# Patient Record
Sex: MALE | Race: WHITE
[De-identification: names, ages, dates, MRNs, and addresses within clinical notes are randomized per-mention and may not be internally consistent; named-entity substitution may affect disease eponyms.]

---

## 2018-10-22 ENCOUNTER — HOSPITAL ENCOUNTER (OUTPATIENT)
Dept: HOSPITAL 61 - KCIC MRI | Age: 38
Discharge: HOME | End: 2018-10-22
Attending: ORTHOPAEDIC SURGERY
Payer: COMMERCIAL
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
## 2018-10-22 DIAGNOSIS — S42.031A: Primary | ICD-10-CM

## 2018-10-22 PROCEDURE — 73221 MRI JOINT UPR EXTREM W/O DYE: CPT

## 2018-10-23 NOTE — [facility]
EXAM: MRI right shoulder

 

DATE: 10/22/2018 12:00 AM

 

COMPARISON: None

 

INDICATION: Right shoulder pain for 9 months, plays basketball

 

TECHNIQUE: Multiplanar multisequence MR imaging of the right shoulder was 

performed without IV contrast.

 

FINDINGS:

There is marked AC joint edema most prominent within the distal clavicle 

and, to a lesser extent within the acromion. Associated small joint 

effusion is seen with extensive capsular/ligamentous edema. Subchondral 

linear T1 signal is seen within the distal clavicle suspicious for 

nondisplaced, incomplete fracture. Small osteophytes are seen at the AC 

joint. There is no AC joint offset. Coracoclavicular ligaments are 

preserved.

 

No significant subacromial-subdeltoid bursal fluid. 

 

Rotator cuff is intact. Mild increased signal within the distal 

supraspinatus tendon a represent mild tendinosis. Normal rotator cuff 

muscle signal and bulk.

 

Multiloculated cystic change is seen within the humeral head arising from 

the infraspinatus attachment of the greater tuberosity measuring 2.2 x 1 

cm.

 

Extra articular long head biceps tendon is seen within the bicipital 

groove. Intra-articular long head biceps tendon is normal in signal and 

morphology.

 

On this nonarthrographic exam, no discrete labral tear is identified.

 

No definite full-thickness cartilage defect is identified. 

 

IMPRESSION: 

1.  Marked edema centered at the AC joint however greater within the 

distal clavicle, with subtle suspected distal clavicular fracture, 

possibly stress fracture. Findings may also be sequelae of contusion or 

degenerative change. AC separation is felt to be less likely given 

congruent AC joint and normal appearance of the coracoclavicular 

ligaments.

2.  Prominent intraosseous ganglion arising from the infraspinatus tendon 

attachment

 

Electronically signed by: Saurabh Woodruff MD (10/23/2018 10:11 AM) [id number]